# Patient Record
Sex: MALE | Race: BLACK OR AFRICAN AMERICAN | NOT HISPANIC OR LATINO | Employment: PART TIME | URBAN - METROPOLITAN AREA
[De-identification: names, ages, dates, MRNs, and addresses within clinical notes are randomized per-mention and may not be internally consistent; named-entity substitution may affect disease eponyms.]

---

## 2020-01-12 ENCOUNTER — HOSPITAL ENCOUNTER (EMERGENCY)
Facility: HOSPITAL | Age: 30
Discharge: HOME | End: 2020-01-12
Attending: EMERGENCY MEDICINE | Admitting: EMERGENCY MEDICINE
Payer: COMMERCIAL

## 2020-01-12 ENCOUNTER — APPOINTMENT (EMERGENCY)
Dept: RADIOLOGY | Facility: HOSPITAL | Age: 30
End: 2020-01-12
Payer: COMMERCIAL

## 2020-01-12 VITALS
TEMPERATURE: 98 F | HEIGHT: 71 IN | DIASTOLIC BLOOD PRESSURE: 83 MMHG | BODY MASS INDEX: 22.4 KG/M2 | HEART RATE: 70 BPM | RESPIRATION RATE: 20 BRPM | OXYGEN SATURATION: 99 % | WEIGHT: 160 LBS | SYSTOLIC BLOOD PRESSURE: 130 MMHG

## 2020-01-12 DIAGNOSIS — J45.21 EXACERBATION OF INTERMITTENT ASTHMA, UNSPECIFIED ASTHMA SEVERITY: ICD-10-CM

## 2020-01-12 DIAGNOSIS — J40 BRONCHITIS: Primary | ICD-10-CM

## 2020-01-12 LAB
ALBUMIN SERPL BCP-MCNC: 3.4 G/DL (ref 3.5–5)
ALP SERPL-CCNC: 61 U/L (ref 46–116)
ALT SERPL W P-5'-P-CCNC: 34 U/L (ref 12–78)
ANION GAP SERPL CALCULATED.3IONS-SCNC: 10 MMOL/L (ref 4–13)
AST SERPL W P-5'-P-CCNC: 42 U/L (ref 5–45)
BASOPHILS # BLD AUTO: 0.02 THOUSANDS/ΜL (ref 0–0.1)
BASOPHILS NFR BLD AUTO: 0 % (ref 0–1)
BILIRUB SERPL-MCNC: 0.6 MG/DL (ref 0.2–1)
BILIRUB UR QL STRIP: NEGATIVE
BUN SERPL-MCNC: 10 MG/DL (ref 5–25)
CALCIUM SERPL-MCNC: 8.6 MG/DL (ref 8.3–10.1)
CHLORIDE SERPL-SCNC: 102 MMOL/L (ref 100–108)
CLARITY UR: CLEAR
CO2 SERPL-SCNC: 28 MMOL/L (ref 21–32)
COLOR UR: YELLOW
CREAT SERPL-MCNC: 0.96 MG/DL (ref 0.6–1.3)
EOSINOPHIL # BLD AUTO: 0 THOUSAND/ΜL (ref 0–0.61)
EOSINOPHIL NFR BLD AUTO: 0 % (ref 0–6)
ERYTHROCYTE [DISTWIDTH] IN BLOOD BY AUTOMATED COUNT: 11.7 % (ref 11.6–15.1)
FLUAV RNA NPH QL NAA+PROBE: NORMAL
FLUBV RNA NPH QL NAA+PROBE: NORMAL
GFR SERPL CREATININE-BSD FRML MDRD: 123 ML/MIN/1.73SQ M
GLUCOSE SERPL-MCNC: 90 MG/DL (ref 65–140)
GLUCOSE UR STRIP-MCNC: NEGATIVE MG/DL
HCT VFR BLD AUTO: 40.9 % (ref 36.5–49.3)
HGB BLD-MCNC: 14.2 G/DL (ref 12–17)
HGB UR QL STRIP.AUTO: NEGATIVE
IMM GRANULOCYTES # BLD AUTO: 0.03 THOUSAND/UL (ref 0–0.2)
IMM GRANULOCYTES NFR BLD AUTO: 1 % (ref 0–2)
KETONES UR STRIP-MCNC: NEGATIVE MG/DL
LACTATE SERPL-SCNC: 2.7 MMOL/L (ref 0.5–2)
LEUKOCYTE ESTERASE UR QL STRIP: NEGATIVE
LYMPHOCYTES # BLD AUTO: 3.37 THOUSANDS/ΜL (ref 0.6–4.47)
LYMPHOCYTES NFR BLD AUTO: 55 % (ref 14–44)
MCH RBC QN AUTO: 32.4 PG (ref 26.8–34.3)
MCHC RBC AUTO-ENTMCNC: 34.7 G/DL (ref 31.4–37.4)
MCV RBC AUTO: 93 FL (ref 82–98)
MONOCYTES # BLD AUTO: 0.36 THOUSAND/ΜL (ref 0.17–1.22)
MONOCYTES NFR BLD AUTO: 6 % (ref 4–12)
NEUTROPHILS # BLD AUTO: 2.31 THOUSANDS/ΜL (ref 1.85–7.62)
NEUTS SEG NFR BLD AUTO: 38 % (ref 43–75)
NITRITE UR QL STRIP: NEGATIVE
NRBC BLD AUTO-RTO: 0 /100 WBCS
PH UR STRIP.AUTO: 6 [PH]
PLATELET # BLD AUTO: 197 THOUSANDS/UL (ref 149–390)
PMV BLD AUTO: 10.7 FL (ref 8.9–12.7)
POTASSIUM SERPL-SCNC: 3.2 MMOL/L (ref 3.5–5.3)
PROT SERPL-MCNC: 8.3 G/DL (ref 6.4–8.2)
PROT UR STRIP-MCNC: NEGATIVE MG/DL
RBC # BLD AUTO: 4.38 MILLION/UL (ref 3.88–5.62)
RSV RNA NPH QL NAA+PROBE: NORMAL
S PYO DNA THROAT QL NAA+PROBE: NORMAL
SODIUM SERPL-SCNC: 140 MMOL/L (ref 136–145)
SP GR UR STRIP.AUTO: 1.01 (ref 1–1.03)
UROBILINOGEN UR QL STRIP.AUTO: 0.2 E.U./DL
WBC # BLD AUTO: 6.09 THOUSAND/UL (ref 4.31–10.16)

## 2020-01-12 PROCEDURE — 84145 PROCALCITONIN (PCT): CPT | Performed by: EMERGENCY MEDICINE

## 2020-01-12 PROCEDURE — 81003 URINALYSIS AUTO W/O SCOPE: CPT | Performed by: EMERGENCY MEDICINE

## 2020-01-12 PROCEDURE — 87631 RESP VIRUS 3-5 TARGETS: CPT | Performed by: EMERGENCY MEDICINE

## 2020-01-12 PROCEDURE — 96374 THER/PROPH/DIAG INJ IV PUSH: CPT

## 2020-01-12 PROCEDURE — 87651 STREP A DNA AMP PROBE: CPT | Performed by: EMERGENCY MEDICINE

## 2020-01-12 PROCEDURE — 80053 COMPREHEN METABOLIC PANEL: CPT | Performed by: EMERGENCY MEDICINE

## 2020-01-12 PROCEDURE — 96361 HYDRATE IV INFUSION ADD-ON: CPT

## 2020-01-12 PROCEDURE — 93005 ELECTROCARDIOGRAM TRACING: CPT

## 2020-01-12 PROCEDURE — 36415 COLL VENOUS BLD VENIPUNCTURE: CPT | Performed by: EMERGENCY MEDICINE

## 2020-01-12 PROCEDURE — 71046 X-RAY EXAM CHEST 2 VIEWS: CPT

## 2020-01-12 PROCEDURE — 83605 ASSAY OF LACTIC ACID: CPT | Performed by: EMERGENCY MEDICINE

## 2020-01-12 PROCEDURE — 94640 AIRWAY INHALATION TREATMENT: CPT

## 2020-01-12 PROCEDURE — 87536 HIV-1 QUANT&REVRSE TRNSCRPJ: CPT | Performed by: EMERGENCY MEDICINE

## 2020-01-12 PROCEDURE — 87040 BLOOD CULTURE FOR BACTERIA: CPT | Performed by: EMERGENCY MEDICINE

## 2020-01-12 PROCEDURE — 99285 EMERGENCY DEPT VISIT HI MDM: CPT | Performed by: EMERGENCY MEDICINE

## 2020-01-12 PROCEDURE — 99285 EMERGENCY DEPT VISIT HI MDM: CPT

## 2020-01-12 PROCEDURE — 85025 COMPLETE CBC W/AUTO DIFF WBC: CPT | Performed by: EMERGENCY MEDICINE

## 2020-01-12 RX ORDER — ONDANSETRON HYDROCHLORIDE 8 MG/1
8 TABLET, FILM COATED ORAL EVERY 8 HOURS PRN
COMMUNITY
Start: 2016-11-30 | End: 2020-01-12

## 2020-01-12 RX ORDER — ONDANSETRON 4 MG/1
4 TABLET, FILM COATED ORAL EVERY 8 HOURS PRN
Qty: 15 TABLET | Refills: 0 | Status: SHIPPED | OUTPATIENT
Start: 2020-01-12

## 2020-01-12 RX ORDER — CEFTRIAXONE SODIUM 250 MG/1
250 INJECTION, POWDER, FOR SOLUTION INTRAMUSCULAR; INTRAVENOUS DAILY
COMMUNITY
Start: 2017-01-17

## 2020-01-12 RX ORDER — BENZONATATE 100 MG/1
100 CAPSULE ORAL 3 TIMES DAILY PRN
Qty: 21 CAPSULE | Refills: 0 | Status: SHIPPED | OUTPATIENT
Start: 2020-01-12

## 2020-01-12 RX ORDER — BENZONATATE 100 MG/1
100 CAPSULE ORAL ONCE
Status: COMPLETED | OUTPATIENT
Start: 2020-01-12 | End: 2020-01-12

## 2020-01-12 RX ORDER — IPRATROPIUM BROMIDE AND ALBUTEROL SULFATE 2.5; .5 MG/3ML; MG/3ML
3 SOLUTION RESPIRATORY (INHALATION) ONCE
Status: COMPLETED | OUTPATIENT
Start: 2020-01-12 | End: 2020-01-12

## 2020-01-12 RX ORDER — METOCLOPRAMIDE HYDROCHLORIDE 5 MG/ML
10 INJECTION INTRAMUSCULAR; INTRAVENOUS ONCE
Status: COMPLETED | OUTPATIENT
Start: 2020-01-12 | End: 2020-01-12

## 2020-01-12 RX ORDER — DIPHENOXYLATE HYDROCHLORIDE AND ATROPINE SULFATE 2.5; .025 MG/1; MG/1
1 TABLET ORAL 4 TIMES DAILY PRN
COMMUNITY
Start: 2016-11-30

## 2020-01-12 RX ORDER — BUDESONIDE AND FORMOTEROL FUMARATE DIHYDRATE 160; 4.5 UG/1; UG/1
2 AEROSOL RESPIRATORY (INHALATION)
COMMUNITY
Start: 2016-05-06

## 2020-01-12 RX ADMIN — BENZONATATE 100 MG: 100 CAPSULE ORAL at 16:01

## 2020-01-12 RX ADMIN — METOCLOPRAMIDE 10 MG: 5 INJECTION, SOLUTION INTRAMUSCULAR; INTRAVENOUS at 13:40

## 2020-01-12 RX ADMIN — IPRATROPIUM BROMIDE AND ALBUTEROL SULFATE 3 ML: 2.5; .5 SOLUTION RESPIRATORY (INHALATION) at 13:12

## 2020-01-12 RX ADMIN — SODIUM CHLORIDE 1000 ML: 0.9 INJECTION, SOLUTION INTRAVENOUS at 13:30

## 2020-01-12 NOTE — DISCHARGE INSTRUCTIONS
Rest and drink lot of fluids  Continue present medications  Start Tessalon Perles for cough and Zofran for vomiting as needed  Contact your infectious disease specialist tomorrow morning  Have her review today's chart and results

## 2020-01-12 NOTE — SEPSIS NOTE
Sepsis Note   Portia Govea III 34 y o  male MRN: [id number]  Unit/Bed#: ED 04 Encounter: [id number]      qSOFA     Row Name 01/12/20 1600 01/12/20 1515 01/12/20 1218          Altered mental status GCS < 15            Respiratory Rate > / =22  0  0  0      Systolic BP < / =234  0  0  0      Q Sofa Score  0  0  0          Initial Sepsis Screening     Row Name 01/12/20 1522                Is the patient's history suggestive of a new or worsening infection? (!) Yes (Proceed)  -MF        Suspected source of infection   Probably viral bronchitis  -MF        Are two or more of the following signs & symptoms of infection both present and new to the patient? No  -MF        Indicate SIRS criteria          If the answer is yes to both questions, suspicion of sepsis is present          If severe sepsis is present AND tissue hypoperfusion perists in the hour after fluid resuscitation or lactate > 4, the patient meets criteria for SEPTIC SHOCK          Are any of the following organ dysfunction criteria present within 6 hours of suspected infection and SIRS criteria that are NOT considered to be chronic conditions?         Organ dysfunction          Date of presentation of severe sepsis          Time of presentation of severe sepsis          Tissue hypoperfusion persists in the hour after crystalloid fluid administration, evidenced, by either:          Was hypotension present within one hour of the conclusion of crystalloid fluid administration?           Date of presentation of septic shock          Time of presentation of septic shock            User Key  (r) = Recorded By, (t) = Taken By, (c) = Cosigned By    Initials Name Provider Type    AUGUSTINA Finney DO Physician

## 2020-01-12 NOTE — ED PROVIDER NOTES
History  Chief Complaint   Patient presents with    Shortness of Breath     Patient reports SOB and cough x1 weeks  states that he is unable to cough up mucus due to thickness of mucus  admits to fever, n/v/d  This 70-year-old man with history of HIV and asthma states that he has had fever, rigors,sore throat, nausea, diarrhea, cough, wheeze, dyspnea and weakness for the past 5 days  He admits having been noncompliant with his HIV medications but started back on Genvoya 1 or 2 weeks ago  He states his inhalers are not helping with his breathing  He feels that the chest is getting tighter and congested but cough only produces small amounts of clear sputum  He denies headache, nasal congestion, palpitations, bloody stool, dysuria and skin rash or wound  Prior to Admission Medications   Prescriptions Last Dose Informant Patient Reported? Taking?   budesonide-formoterol (SYMBICORT) 160-4 5 mcg/act inhaler 1/12/2020 at Unknown time  Yes Yes   Sig: Inhale 2 puffs   cefTRIAXone (ROCEPHIN) 250 mg injection Not Taking at Unknown time  Yes No   Sig: Inject 250 mg into a muscle daily   diphenoxylate-atropine (LOMOTIL) 2 5-0 025 mg per tablet Not Taking at Unknown time  Yes No   Sig: Take 1 tablet by mouth 4 (four) times a day as needed   elvitegravir-cobicistat-emtricitabine-tenofovir alafenamide (GENVOYA) tablet 1/12/2020 at Unknown time  Yes Yes   Sig: TAKE 1 TABLET BY MOUTH EVERY DAY   ipratropium-albuterol (COMBIVENT RESPIMAT) inhaler 1/12/2020 at Unknown time  Yes Yes   Sig: Inhale 1 puff 4 (four) times a day   ondansetron (ZOFRAN) 8 mg tablet More than a month at Unknown time  Yes No   Sig: Take 8 mg by mouth every 8 (eight) hours as needed      Facility-Administered Medications: None       Past Medical History:   Diagnosis Date    Asthma     [facility]        History reviewed  No pertinent surgical history  History reviewed  No pertinent family history    I have reviewed and agree with the history as documented  Social History     Tobacco Use    Smoking status: Current Every Day Smoker     Types: Cigars    Smokeless tobacco: Never Used   Substance Use Topics    Alcohol use: Yes     Alcohol/week: 28 0 standard drinks     Types: 28 Cans of beer per week    Drug use: Not Currently        Review of Systems   Constitutional: Positive for appetite change, chills, fatigue and fever  HENT: Positive for rhinorrhea and sore throat  Negative for congestion, ear pain, postnasal drip and sinus pressure  Eyes: Negative  Respiratory: Positive for cough, shortness of breath and wheezing  Cardiovascular: Negative  Gastrointestinal: Positive for diarrhea and nausea  Negative for abdominal pain and vomiting  Endocrine: Negative  Genitourinary: Negative  Musculoskeletal: Positive for back pain and myalgias  Negative for neck stiffness  Skin: Negative  Allergic/Immunologic: Negative  Neurological: Negative  Hematological: Negative  Psychiatric/Behavioral: Negative  All other systems reviewed and are negative  Physical Exam  Physical Exam   Constitutional: He is oriented to person, place, and time  He appears well-developed and well-nourished  Non-toxic appearance  He appears ill  He appears distressed  HENT:   Head: Normocephalic and atraumatic  Right Ear: External ear normal    Left Ear: External ear normal    Mouth/Throat: Oropharyngeal exudate present  No posterior oropharyngeal edema  Eyes: Pupils are equal, round, and reactive to light  Conjunctivae and EOM are normal    Neck: Normal range of motion  Neck supple  No JVD present  Cardiovascular: Normal rate, regular rhythm, normal heart sounds and intact distal pulses  No murmur heard  Pulmonary/Chest: Effort normal and breath sounds normal  He exhibits no bony tenderness  Frequent coughing   Abdominal: Soft  Bowel sounds are normal  He exhibits no mass   There is no tenderness  There is no rebound and no guarding  Musculoskeletal: Normal range of motion  He exhibits no edema or tenderness  Right lower leg: Normal         Left lower leg: Normal    Lymphadenopathy:     He has no cervical adenopathy  Neurological: He is alert and oriented to person, place, and time  He has normal reflexes  No cranial nerve deficit  Coordination normal    Skin: Skin is warm and dry  Capillary refill takes less than 2 seconds  No rash noted  Psychiatric: He has a normal mood and affect  His behavior is normal  His mood appears not anxious  He is not agitated  Nursing note and vitals reviewed        Vital Signs  ED Triage Vitals   Temperature Pulse Respirations Blood Pressure SpO2   01/12/20 1218 01/12/20 1218 01/12/20 1218 01/12/20 1218 01/12/20 1218   98 °F (36 7 °C) 86 20 162/92 98 %      Temp Source Heart Rate Source Patient Position - Orthostatic VS BP Location FiO2 (%)   01/12/20 1218 01/12/20 1515 01/12/20 1218 01/12/20 1218 --   Tympanic Monitor Sitting Right arm       Pain Score       01/12/20 1218       8           Vitals:    01/12/20 1218 01/12/20 1515 01/12/20 1600   BP: 162/92 129/87 130/83   Pulse: 86 74 70   Patient Position - Orthostatic VS: Sitting Lying Lying         Visual Acuity      ED Medications  Medications   ipratropium-albuterol (DUO-NEB) 0 5-2 5 mg/3 mL inhalation solution 3 mL (3 mL Nebulization Given 1/12/20 1312)   sodium chloride 0 9 % bolus 1,000 mL (1,000 mL Intravenous New Bag 1/12/20 1330)   metoclopramide (REGLAN) injection 10 mg (10 mg Intravenous Given 1/12/20 1340)   benzonatate (TESSALON PERLES) capsule 100 mg (100 mg Oral Given 1/12/20 1601)       Diagnostic Studies  Results Reviewed     Procedure Component Value Units Date/Time    Lactic acid, plasma [[id number]]  (Abnormal) Collected:  01/12/20 1331    Lab Status:  Final result Specimen:  Blood from Arm, Right Updated:  01/12/20 1521     LACTIC ACID 2 7 mmol/L     Narrative:       Result may be elevated if tourniquet was used during collection      Strep A PCR [[id number]]  (Normal) Collected:  01/12/20 1336    Lab Status:  Final result Specimen:  Throat Updated:  01/12/20 1504     STREP A PCR None Detected    Influenza A/B and RSV PCR [[id number]]  (Normal) Collected:  01/12/20 1336    Lab Status:  Final result Specimen:  Nares from Nose Updated:  01/12/20 1503     INFLUENZA A PCR None Detected     INFLUENZA B PCR None Detected     RSV PCR None Detected    Comprehensive metabolic panel [[id number]]  (Abnormal) Collected:  01/12/20 1331    Lab Status:  Final result Specimen:  Blood from Arm, Right Updated:  01/12/20 1437     Sodium 140 mmol/L      Potassium 3 2 mmol/L      Chloride 102 mmol/L      CO2 28 mmol/L      ANION GAP 10 mmol/L      BUN 10 mg/dL      Creatinine 0 96 mg/dL      Glucose 90 mg/dL      Calcium 8 6 mg/dL      AST 42 U/L      ALT 34 U/L      Alkaline Phosphatase 61 U/L      Total Protein 8 3 g/dL      Albumin 3 4 g/dL      Total Bilirubin 0 60 mg/dL      eGFR 123 ml/min/1 73sq m     Narrative:       [facility] guidelines for Chronic Kidney Disease (CKD):     Stage 1 with normal or high GFR (GFR > 90 mL/min/1 73 square meters)    Stage 2 Mild CKD (GFR = 60-89 mL/min/1 73 square meters)    Stage 3A Moderate CKD (GFR = 45-59 mL/min/1 73 square meters)    Stage 3B Moderate CKD (GFR = 30-44 mL/min/1 73 square meters)    Stage 4 Severe CKD (GFR = 15-29 mL/min/1 73 square meters)    Stage 5 End Stage CKD (GFR <15 mL/min/1 73 square meters)  Note: GFR calculation is accurate only with a steady state creatinine    UA (URINE) with reflex to Scope [[id number]] Collected:  01/12/20 1336    Lab Status:  Final result Specimen:  Urine, Clean Catch Updated:  01/12/20 1404     Color, UA Yellow     Clarity, UA Clear     Specific Gravity, UA 1 010     pH, UA 6 0     Leukocytes, UA Negative     Nitrite, UA Negative     Protein, UA Negative mg/dl      Glucose, UA Negative mg/dl Ketones, UA Negative mg/dl      Urobilinogen, UA 0 2 E U /dl      Bilirubin, UA Negative     Blood, UA Negative    CBC and differential [[id number]]  (Abnormal) Collected:  01/12/20 1331    Lab Status:  Final result Specimen:  Blood from Arm, Right Updated:  01/12/20 1402     WBC 6 09 Thousand/uL      RBC 4 38 Million/uL      Hemoglobin 14 2 g/dL      Hematocrit 40 9 %      MCV 93 fL      MCH 32 4 pg      MCHC 34 7 g/dL      RDW 11 7 %      MPV 10 7 fL      Platelets 241 Thousands/uL      nRBC 0 /100 WBCs      Neutrophils Relative 38 %      Immat GRANS % 1 %      Lymphocytes Relative 55 %      Monocytes Relative 6 %      Eosinophils Relative 0 %      Basophils Relative 0 %      Neutrophils Absolute 2 31 Thousands/µL      Immature Grans Absolute 0 03 Thousand/uL      Lymphocytes Absolute 3 37 Thousands/µL      Monocytes Absolute 0 36 Thousand/µL      Eosinophils Absolute 0 00 Thousand/µL      Basophils Absolute 0 02 Thousands/µL     Procalcitonin [[id number]] Collected:  01/12/20 1331    Lab Status: In process Specimen:  Blood from Arm, Right Updated:  01/12/20 1358    Blood culture #1 [[id number]] Collected:  01/12/20 1330    Lab Status: In process Specimen:  Blood from Arm, Right Updated:  01/12/20 1357    HIV-1 RNA, quantitative, PCR [[id number]] Collected:  01/12/20 1330    Lab Status: In process Specimen:  Blood from Arm, Right Updated:  01/12/20 1355    T-helper cells CD4/CD8 % [[id number]] Collected:  01/12/20 1330    Lab Status: In process Specimen:  Blood from Arm, Right Updated:  01/12/20 1355    Blood culture #2 [[id number]] Collected:  01/12/20 1331    Lab Status:   In process Specimen:  Blood from Hand, Right Updated:  01/12/20 1354                 XR chest 2 views   ED Interpretation by Trevon José DO (01/12 1535)   No infiltrate pneumothorax or effusion                 Procedures  ECG 12 Lead Documentation Only  Date/Time: 1/12/2020 4:21 PM  Performed by: Trevon José DO  Authorized by: Elinor Ferris CINDY Stafford,      ECG reviewed by me, the ED Provider: yes    Patient location:  ED  Previous ECG:     Previous ECG:  Unavailable  Rhythm:     Rhythm: sinus rhythm    Ectopy:     Ectopy: none    QRS:     QRS axis:  Normal    QRS intervals:  Normal  Conduction:     Conduction: normal    ST segments:     ST segments:  Normal  T waves:     T waves: normal    Q waves:     Q waves:  V2             ED Course  ED Course as of Jan 12 1625   Sun Jan 12, 2020   1535 Patient feels much better  No coughing currently  He does ask for an antitussive though  Vital signs are stable  Initial Sepsis Screening     Row Name 01/12/20 1522                Is the patient's history suggestive of a new or worsening infection? (!) Yes (Proceed)  -MF        Suspected source of infection   Probably viral bronchitis  -MF        Are two or more of the following signs & symptoms of infection both present and new to the patient? No  -MF        Indicate SIRS criteria          If the answer is yes to both questions, suspicion of sepsis is present          If severe sepsis is present AND tissue hypoperfusion perists in the hour after fluid resuscitation or lactate > 4, the patient meets criteria for SEPTIC SHOCK          Are any of the following organ dysfunction criteria present within 6 hours of suspected infection and SIRS criteria that are NOT considered to be chronic conditions?         Organ dysfunction          Date of presentation of severe sepsis          Time of presentation of severe sepsis          Tissue hypoperfusion persists in the hour after crystalloid fluid administration, evidenced, by either:          Was hypotension present within one hour of the conclusion of crystalloid fluid administration?           Date of presentation of septic shock          Time of presentation of septic shock            User Key  (r) = Recorded By, (t) = Taken By, (c) = Cosigned By    Initials Name Provider Type     Charly Tolentino DO Physician                  MDM  Number of Diagnoses or Management Options  Bronchitis: new and requires workup  Exacerbation of intermittent asthma, unspecified asthma severity: new and requires workup     Amount and/or Complexity of Data Reviewed  Clinical lab tests: ordered and reviewed  Tests in the radiology section of CPT®: ordered and reviewed  Independent visualization of images, tracings, or specimens: yes          Disposition  Final diagnoses:   Bronchitis   Exacerbation of intermittent asthma, unspecified asthma severity     Time reflects when diagnosis was documented in both MDM as applicable and the Disposition within this note     Time User Action Codes Description Comment    1/12/2020  4:14 PM Elvis Radon Add [J40] Bronchitis     1/12/2020  4:14 PM Elvis Radon Add [J45 21] Exacerbation of intermittent asthma, unspecified asthma severity       ED Disposition     ED Disposition Condition Date/Time Comment    Discharge Stable Sun Jan 12, 2020  4:14 PM Kirit Verma III discharge to home/self care  Follow-up Information     Follow up With Specialties Details Why [facility] Miriam Izquierdo MD Infectious Diseases Call in 1 day For follow-up [address] [facility]  [phone]            Patient's Medications   Discharge Prescriptions    BENZONATATE (TESSALON PERLES) 100 MG CAPSULE    Take 1 capsule (100 mg total) by mouth 3 (three) times a day as needed for cough       Start Date: 1/12/2020 End Date: --       Order Dose: 100 mg       Quantity: 21 capsule    Refills: 0    ONDANSETRON (ZOFRAN) 4 MG TABLET    Take 1 tablet (4 mg total) by mouth every 8 (eight) hours as needed for nausea or vomiting       Start Date: 1/12/2020 End Date: --       Order Dose: 4 mg       Quantity: 15 tablet    Refills: 0     No discharge procedures on file      ED Provider  Electronically Signed by           Charly Tolentino DO  01/12/20 Yobani Stafford DO  01/12/20 1838

## 2020-01-13 LAB — PROCALCITONIN SERPL-MCNC: <0.05 NG/ML

## 2020-01-14 LAB
ATRIAL RATE: 74 BPM
ATRIAL RATE: 78 BPM
P AXIS: 66 DEGREES
P AXIS: 72 DEGREES
PR INTERVAL: 140 MS
PR INTERVAL: 140 MS
QRS AXIS: 76 DEGREES
QRS AXIS: 78 DEGREES
QRSD INTERVAL: 92 MS
QRSD INTERVAL: 94 MS
QT INTERVAL: 374 MS
QT INTERVAL: 374 MS
QTC INTERVAL: 415 MS
QTC INTERVAL: 426 MS
T WAVE AXIS: 59 DEGREES
T WAVE AXIS: 60 DEGREES
VENTRICULAR RATE: 74 BPM
VENTRICULAR RATE: 78 BPM

## 2020-01-14 PROCEDURE — 93010 ELECTROCARDIOGRAM REPORT: CPT | Performed by: INTERNAL MEDICINE

## 2020-01-16 LAB
HIV1 RNA # SERPL NAA+PROBE: 1850 COPIES/ML
HIV1 RNA SERPL NAA+PROBE-LOG#: 3.27 LOG10COPY/ML

## 2020-01-17 LAB
BACTERIA BLD CULT: NORMAL
BACTERIA BLD CULT: NORMAL

## 2023-07-05 ENCOUNTER — APPOINTMENT (OUTPATIENT)
Dept: RADIOLOGY | Facility: HOSPITAL | Age: 33
End: 2023-07-05
Payer: COMMERCIAL

## 2023-07-05 ENCOUNTER — APPOINTMENT (EMERGENCY)
Dept: CT IMAGING | Facility: HOSPITAL | Age: 33
End: 2023-07-05
Payer: COMMERCIAL

## 2023-07-05 ENCOUNTER — HOSPITAL ENCOUNTER (EMERGENCY)
Facility: HOSPITAL | Age: 33
Discharge: HOME | End: 2023-07-06
Attending: EMERGENCY MEDICINE
Payer: COMMERCIAL

## 2023-07-05 ENCOUNTER — APPOINTMENT (EMERGENCY)
Dept: RADIOLOGY | Facility: HOSPITAL | Age: 33
End: 2023-07-05
Payer: COMMERCIAL

## 2023-07-05 VITALS
DIASTOLIC BLOOD PRESSURE: 76 MMHG | HEIGHT: 71 IN | BODY MASS INDEX: 25.9 KG/M2 | RESPIRATION RATE: 20 BRPM | OXYGEN SATURATION: 97 % | TEMPERATURE: 98.8 F | SYSTOLIC BLOOD PRESSURE: 121 MMHG | HEART RATE: 87 BPM | WEIGHT: 185 LBS

## 2023-07-05 DIAGNOSIS — M25.532 ACUTE PAIN OF LEFT WRIST: Primary | ICD-10-CM

## 2023-07-05 DIAGNOSIS — R07.9 CHEST PAIN: ICD-10-CM

## 2023-07-05 LAB
ANION GAP SERPL CALCULATED.3IONS-SCNC: 13 MMOL/L
BASOPHILS # BLD AUTO: 0.03 THOUSANDS/ÂΜL (ref 0–0.1)
BASOPHILS NFR BLD AUTO: 1 % (ref 0–1)
BNP SERPL-MCNC: 7 PG/ML (ref 0–100)
BUN SERPL-MCNC: 7 MG/DL (ref 5–25)
CALCIUM SERPL-MCNC: 9.4 MG/DL (ref 8.4–10.2)
CARDIAC TROPONIN I PNL SERPL HS: 4 NG/L
CHLORIDE SERPL-SCNC: 102 MMOL/L (ref 96–108)
CO2 SERPL-SCNC: 24 MMOL/L (ref 21–32)
CREAT SERPL-MCNC: 0.82 MG/DL (ref 0.6–1.3)
D DIMER PPP FEU-MCNC: 0.59 UG/ML FEU
EOSINOPHIL # BLD AUTO: 0.11 THOUSAND/ÂΜL (ref 0–0.61)
EOSINOPHIL NFR BLD AUTO: 2 % (ref 0–6)
ERYTHROCYTE [DISTWIDTH] IN BLOOD BY AUTOMATED COUNT: 11.2 % (ref 11.6–15.1)
GFR SERPL CREATININE-BSD FRML MDRD: 116 ML/MIN/1.73SQ M
GLUCOSE SERPL-MCNC: 107 MG/DL (ref 65–140)
HCT VFR BLD AUTO: 44.8 % (ref 36.5–49.3)
HGB BLD-MCNC: 15.3 G/DL (ref 12–17)
IMM GRANULOCYTES # BLD AUTO: 0.02 THOUSAND/UL (ref 0–0.2)
IMM GRANULOCYTES NFR BLD AUTO: 0 % (ref 0–2)
LYMPHOCYTES # BLD AUTO: 2.16 THOUSANDS/ÂΜL (ref 0.6–4.47)
LYMPHOCYTES NFR BLD AUTO: 42 % (ref 14–44)
MCH RBC QN AUTO: 32.5 PG (ref 26.8–34.3)
MCHC RBC AUTO-ENTMCNC: 34.2 G/DL (ref 31.4–37.4)
MCV RBC AUTO: 95 FL (ref 82–98)
MONOCYTES # BLD AUTO: 0.39 THOUSAND/ÂΜL (ref 0.17–1.22)
MONOCYTES NFR BLD AUTO: 8 % (ref 4–12)
NEUTROPHILS # BLD AUTO: 2.49 THOUSANDS/ÂΜL (ref 1.85–7.62)
NEUTS SEG NFR BLD AUTO: 47 % (ref 43–75)
NRBC BLD AUTO-RTO: 0 /100 WBCS
PLATELET # BLD AUTO: 209 THOUSANDS/UL (ref 149–390)
PMV BLD AUTO: 9.8 FL (ref 8.9–12.7)
POTASSIUM SERPL-SCNC: 3.6 MMOL/L (ref 3.5–5.3)
RBC # BLD AUTO: 4.71 MILLION/UL (ref 3.88–5.62)
SODIUM SERPL-SCNC: 139 MMOL/L (ref 135–147)
WBC # BLD AUTO: 5.2 THOUSAND/UL (ref 4.31–10.16)

## 2023-07-05 PROCEDURE — 36415 COLL VENOUS BLD VENIPUNCTURE: CPT

## 2023-07-05 PROCEDURE — G1004 CDSM NDSC: HCPCS

## 2023-07-05 PROCEDURE — 85025 COMPLETE CBC W/AUTO DIFF WBC: CPT | Performed by: EMERGENCY MEDICINE

## 2023-07-05 PROCEDURE — 71275 CT ANGIOGRAPHY CHEST: CPT

## 2023-07-05 PROCEDURE — 84484 ASSAY OF TROPONIN QUANT: CPT | Performed by: EMERGENCY MEDICINE

## 2023-07-05 PROCEDURE — 80048 BASIC METABOLIC PNL TOTAL CA: CPT | Performed by: EMERGENCY MEDICINE

## 2023-07-05 PROCEDURE — 93005 ELECTROCARDIOGRAM TRACING: CPT

## 2023-07-05 PROCEDURE — 83880 ASSAY OF NATRIURETIC PEPTIDE: CPT | Performed by: EMERGENCY MEDICINE

## 2023-07-05 PROCEDURE — 85379 FIBRIN DEGRADATION QUANT: CPT | Performed by: EMERGENCY MEDICINE

## 2023-07-05 PROCEDURE — 73130 X-RAY EXAM OF HAND: CPT

## 2023-07-05 PROCEDURE — 71046 X-RAY EXAM CHEST 2 VIEWS: CPT

## 2023-07-05 RX ADMIN — IOHEXOL 85 ML: 350 INJECTION, SOLUTION INTRAVENOUS at 22:58

## 2023-07-05 NOTE — Clinical Note
Marissa [place] was seen and treated in our emergency department on 7/5/2023. No restrictions            Diagnosis:     Waldo Ashton  . He may return on this date: 07/10/2023         If you have any questions or concerns, please don't hesitate to call.       Dileep Quinn, DO    ______________________________           _______________          _______________  Hospital Representative                              Date                                Time

## 2023-07-06 LAB
2HR DELTA HS TROPONIN: -1 NG/L
ATRIAL RATE: 83 BPM
ATRIAL RATE: 85 BPM
ATRIAL RATE: 94 BPM
CARDIAC TROPONIN I PNL SERPL HS: 3 NG/L
P AXIS: 50 DEGREES
P AXIS: 55 DEGREES
P AXIS: 55 DEGREES
PR INTERVAL: 142 MS
PR INTERVAL: 146 MS
PR INTERVAL: 148 MS
QRS AXIS: 52 DEGREES
QRS AXIS: 53 DEGREES
QRS AXIS: 84 DEGREES
QRSD INTERVAL: 84 MS
QRSD INTERVAL: 84 MS
QRSD INTERVAL: 88 MS
QT INTERVAL: 352 MS
QT INTERVAL: 356 MS
QT INTERVAL: 360 MS
QTC INTERVAL: 418 MS
QTC INTERVAL: 423 MS
QTC INTERVAL: 445 MS
T WAVE AXIS: 1 DEGREES
T WAVE AXIS: 34 DEGREES
T WAVE AXIS: 35 DEGREES
VENTRICULAR RATE: 83 BPM
VENTRICULAR RATE: 85 BPM
VENTRICULAR RATE: 94 BPM

## 2023-07-06 PROCEDURE — 93010 ELECTROCARDIOGRAM REPORT: CPT | Performed by: INTERNAL MEDICINE

## 2023-07-06 PROCEDURE — 93005 ELECTROCARDIOGRAM TRACING: CPT

## 2023-07-06 NOTE — DISCHARGE INSTRUCTIONS
Please follow-up with orthopedic surgery for further care, if symptoms worsen please return to the emergency department

## 2023-07-06 NOTE — ED PROVIDER NOTES
History  Chief Complaint   Patient presents with   • Arm Swelling     Pt reports left hand swelling with redness,leg swelling. Patient on medicine from cardiologist,carvedilol. Pt eliquis for arrhythmia for a month. • Chest Pain     35year old male with multiple complaints , hx of Asthma, HIV (undetectable viral load but possibly low CD4 count, no available records) currently on Lavada Core which he has been compliant with presents for evaluation of chest pain, severe pressure this morning which has since resolved, nonexertional, nonpositional, no associated shortness of breath nausea vomiting. States that it is the worst he is ever felt and does have significant family history of cardiac arrest at an early age in mom so he was worried. He states that he is currently on carvedilol for some sort of arrhythmia and extraventricular beats also on Eliquis for possible atrial fibrillation after wearing an outpatient Holter monitor for 1 week. These medicines are prescribed by his cardiologist at [facility]. Also states that he has been having left wrist swelling which improved after ibuprofen, pain with movement and paresthesias which has been going on since yesterday. Also with some ankle swelling that has since resolved, no leg swelling . No asymmetrical ankle swelling or ankle pain . no arm swelling, no current chest pain or shortness of breath. History of DVT or PE. Has been compliant with his Eliquis          Prior to Admission Medications   Prescriptions Last Dose Informant Patient Reported?  Taking?   benzonatate (TESSALON PERLES) 100 mg capsule   No No   Sig: Take 1 capsule (100 mg total) by mouth 3 (three) times a day as needed for cough   budesonide-formoterol (SYMBICORT) 160-4.5 mcg/act inhaler   Yes No   Sig: Inhale 2 puffs   cefTRIAXone (ROCEPHIN) 250 mg injection   Yes No   Sig: Inject 250 mg into a muscle daily   diphenoxylate-atropine (LOMOTIL) 2.5-0.025 mg per tablet   Yes No   Sig: Take 1 tablet by mouth 4 (four) times a day as needed   elvitegravir-cobicistat-emtricitabine-tenofovir alafenamide (GENVOYA) tablet   Yes No   Sig: TAKE 1 TABLET BY MOUTH EVERY DAY   ipratropium-albuterol (COMBIVENT RESPIMAT) inhaler   Yes No   Sig: Inhale 1 puff 4 (four) times a day   ondansetron (ZOFRAN) 4 mg tablet   No No   Sig: Take 1 tablet (4 mg total) by mouth every 8 (eight) hours as needed for nausea or vomiting      Facility-Administered Medications: None       Past Medical History:   Diagnosis Date   • Asthma    • HIV (human immunodeficiency virus infection) ([address])    • Hypertension        History reviewed. No pertinent surgical history. History reviewed. No pertinent family history. I have reviewed and agree with the history as documented. E-Cigarette/Vaping     E-Cigarette/Vaping Substances     Social History     Tobacco Use   • Smoking status: Every Day     Types: Cigars   • Smokeless tobacco: Never   Substance Use Topics   • Alcohol use: Yes     Alcohol/week: 28.0 standard drinks of alcohol     Types: 28 Cans of beer per week   • Drug use: Not Currently       Review of Systems   Constitutional: Negative for appetite change, chills and fever. HENT: Negative for rhinorrhea and sore throat. Eyes: Negative for photophobia and visual disturbance. Respiratory: Negative for cough and shortness of breath. Cardiovascular: Positive for chest pain. Negative for palpitations. Gastrointestinal: Negative for abdominal pain and diarrhea. Genitourinary: Negative for dysuria, frequency and urgency. Musculoskeletal:        Left wrist pain   Skin: Negative for rash. Neurological: Negative for dizziness and weakness. All other systems reviewed and are negative. Physical Exam  Physical Exam  Vitals and nursing note reviewed. Constitutional:       Appearance: He is well-developed. HENT:      Head: Normocephalic and atraumatic.       Right Ear: External ear normal.      Left Ear: External ear normal. Eyes:      Conjunctiva/sclera: Conjunctivae normal.      Pupils: Pupils are equal, round, and reactive to light. Neck:      Vascular: No JVD. Trachea: No tracheal deviation. Cardiovascular:      Rate and Rhythm: Normal rate and regular rhythm. Heart sounds: Normal heart sounds. No murmur heard. No friction rub. No gallop. Pulmonary:      Effort: Pulmonary effort is normal. No respiratory distress. Breath sounds: No stridor. No wheezing or rales. Abdominal:      General: There is no distension. Palpations: Abdomen is soft. There is no mass. Tenderness: There is no abdominal tenderness. There is no guarding or rebound. Musculoskeletal:         General: Normal range of motion. Cervical back: Normal range of motion and neck supple. Right lower leg: No tenderness. Left lower leg: No tenderness. Comments: No obvious swelling to bilateral arms, palpable radial pulses bilateral wrists, neurovascular intact with mild decreased  strength in left hand  Bilateral lower extremity symmetrical without any calf tenderness, no lower extremity swelling, no ankle swelling   Skin:     General: Skin is warm and dry. Coloration: Skin is not pale. Findings: No erythema or rash. Neurological:      Mental Status: He is alert and oriented to person, place, and time. Cranial Nerves: No cranial nerve deficit.          Vital Signs  ED Triage Vitals   Temperature Pulse Respirations Blood Pressure SpO2   07/05/23 2135 07/05/23 2135 07/05/23 2135 07/05/23 2135 07/05/23 2135   98.8 °F (37.1 °C) 87 18 142/89 98 %      Temp Source Heart Rate Source Patient Position - Orthostatic VS BP Location FiO2 (%)   07/05/23 2135 07/05/23 2315 -- -- --   Oral Monitor         Pain Score       07/05/23 2135       No Pain           Vitals:    07/05/23 2135 07/05/23 2315 07/05/23 2330   BP: 142/89 135/95 121/76   Pulse: 87 86 87         Visual Acuity      ED Medications  Medications iohexol (OMNIPAQUE) 350 MG/ML injection (SINGLE-DOSE) 85 mL (85 mL Intravenous Given 7/5/23 2258)       Diagnostic Studies  Results Reviewed     Procedure Component Value Units Date/Time    HS Troponin I 2hr [[id number]]  (Normal) Collected: 07/05/23 2339    Lab Status: Final result Specimen: Blood from Arm, Right Updated: 07/06/23 0009     hs TnI 2hr 3 ng/L      Delta 2hr hsTnI -1 ng/L     HS Troponin I 4hr [[id number]]     Lab Status: No result Specimen: Blood     B-Type Natriuretic Peptide(BNP) [[id number]]  (Normal) Collected: 07/05/23 2147    Lab Status: Final result Specimen: Blood from Arm, Right Updated: 07/05/23 2219     BNP 7 pg/mL     D-dimer, quantitative [[id number]]  (Abnormal) Collected: 07/05/23 2147    Lab Status: Final result Specimen: Blood from Arm, Right Updated: 07/05/23 2218     D-Dimer, Quant 0.59 ug/ml FEU     HS Troponin 0hr (reflex protocol) [[id number]]  (Normal) Collected: 07/05/23 2147    Lab Status: Final result Specimen: Blood from Arm, Right Updated: 07/05/23 2216     hs TnI 0hr 4 ng/L     Basic metabolic panel [[id number]] Collected: 07/05/23 2147    Lab Status: Final result Specimen: Blood from Arm, Right Updated: 07/05/23 2208     Sodium 139 mmol/L      Potassium 3.6 mmol/L      Chloride 102 mmol/L      CO2 24 mmol/L      ANION GAP 13 mmol/L      BUN 7 mg/dL      Creatinine 0.82 mg/dL      Glucose 107 mg/dL      Calcium 9.4 mg/dL      eGFR 116 ml/min/1.73sq m     Narrative:      [place] guidelines for Chronic Kidney Disease (CKD):   •  Stage 1 with normal or high GFR (GFR > 90 mL/min/1.73 square meters)  •  Stage 2 Mild CKD (GFR = 60-89 mL/min/1.73 square meters)  •  Stage 3A Moderate CKD (GFR = 45-59 mL/min/1.73 square meters)  •  Stage 3B Moderate CKD (GFR = 30-44 mL/min/1.73 square meters)  •  Stage 4 Severe CKD (GFR = 15-29 mL/min/1.73 square meters)  •  Stage 5 End Stage CKD (GFR <15 mL/min/1.73 square meters)  Note: GFR calculation is accurate only with a steady state creatinine    CBC and differential [[id number]]  (Abnormal) Collected: 07/05/23 2147    Lab Status: Final result Specimen: Blood from Arm, Right Updated: 07/05/23 2154     WBC 5.20 Thousand/uL      RBC 4.71 Million/uL      Hemoglobin 15.3 g/dL      Hematocrit 44.8 %      MCV 95 fL      MCH 32.5 pg      MCHC 34.2 g/dL      RDW 11.2 %      MPV 9.8 fL      Platelets 870 Thousands/uL      nRBC 0 /100 WBCs      Neutrophils Relative 47 %      Immat GRANS % 0 %      Lymphocytes Relative 42 %      Monocytes Relative 8 %      Eosinophils Relative 2 %      Basophils Relative 1 %      Neutrophils Absolute 2.49 Thousands/µL      Immature Grans Absolute 0.02 Thousand/uL      Lymphocytes Absolute 2.16 Thousands/µL      Monocytes Absolute 0.39 Thousand/µL      Eosinophils Absolute 0.11 Thousand/µL      Basophils Absolute 0.03 Thousands/µL                  XR hand 3+ views LEFT   ED Interpretation by Derick Pelaez DO (07/05 2326)   This study was ordered and independently reviewed by me    No acute findings noted         CTA ED chest PE Study   Final Result by Yanique Bueno MD (07/06 0001)      No intraluminal filling defect to suggest an acute pulmonary embolus. No infiltrate or pleural effusion. Workstation performed: [id number]         XR chest pa & lateral   ED Interpretation by Derick Pelaez DO (07/05 2326)   This study was ordered and independently reviewed by me    No acute findings noted                    Procedures  Procedures         ED Course  ED Course as of 07/06/23 0050   Wed Jul 05, 2023   2307 Patient notes reviewed, patient including telemedicine note from 5/19/2022, patient with asthma, in setting of long-haul COVID symptoms, at that time treated for acute exacerbation.   Patient sees Dr. Polly Simpson with infectious disease and [place] for his HIV treatment per patient his current viral load  undetectable but he has been having issues with low CD4 count   [address] for HAART , cd4 in upper 200s, viral load undectable, labs done in may.,   Thu Jul 06, 2023   Jose Door Repeat EKG without any acute changes, did have another EKG taken with with abnormal lead placement that appeared to have inferior lateral changes, currently patient without any chest pain, on replacing the leads EKG looks ischemic with no acute changes patient currently asymptomatic, wrist pain improved with bracing, 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, pain likely in setting of carpal tunnel syndrome , will start on Voltaren gel, symptomatic control with bracing at night, ortho followup no indication for further inpatient treatment or evaluation of the patient's chest pain             HEART Risk Score    Flowsheet Row Most Recent Value   Heart Score Risk Calculator    History 0 Filed at: 07/06/2023 0043   ECG 1 Filed at: 07/06/2023 0043   Age 0 Filed at: 07/06/2023 0043   Risk Factors 1 Filed at: 07/06/2023 0043   Troponin 0 Filed at: 07/06/2023 0043   HEART Score 2 Filed at: 07/06/2023 1813                        SBIRT 22yo+    Flowsheet Row Most Recent Value   Initial Alcohol Screen: US AUDIT-C     1. How often do you have a drink containing alcohol? 1 Filed at: 07/05/2023 2222   2. How many drinks containing alcohol do you have on a typical day you are drinking? 0 Filed at: 07/05/2023 2222   3a. Male UNDER 65: How often do you have five or more drinks on one occasion? 0 Filed at: 07/05/2023 2222   3b. FEMALE Any Age, or MALE 65+: How often do you have 4 or more drinks on one occassion? 0 Filed at: 07/05/2023 2222   Audit-C Score 1 Filed at: 07/05/2023 2222   SORAYA: How many times in the past year have you. .. Used an illegal drug or used a prescription medication for non-medical reasons?  Never Filed at: 07/05/2023 2222                    Medical Decision Making  55-year-old male with with left wrist pain and transient swelling, likely musculoskeletal injury versus carpal tunnel syndrome, low suspicion for fracture, low suspicion for DVT as there is no arm swelling, no erythema, no arm pain. Also with transient chest pain, currently resolved, EKG to evaluate for arrhythmia, troponin negative low suspicion for ACS, myocarditis, pericarditis given description of the symptoms, given elevated D-dimer will obtain CTA to evaluate for PE, infection            Amount and/or Complexity of Data Reviewed  Labs: ordered. Radiology: ordered. Risk  Prescription drug management. Disposition  Final diagnoses:   Acute pain of left wrist   Chest pain     Time reflects when diagnosis was documented in both MDM as applicable and the Disposition within this note     Time User Action Codes Description Comment    7/5/2023 11:27 PM Parveen Ascencio Add [M25.532] Acute pain of left wrist     7/5/2023 11:27 PM Parveen Ascencio Add [R07.9] Chest pain       ED Disposition     ED Disposition   Discharge    Condition   Stable    Date/Time   Thu Jul 6, 2023 12:44 AM    Comment   Lindy Wetzel III discharge to home/self care.                Follow-up Information     Follow up With Specialties Details Why Contact Info Additional [address] [facility] Emergency Department Emergency Medicine  If symptoms worsen [address] [facility] [id number]  [address] [facility] Emergency Department, [address] [facility], [address] [facility]    [address] [facility] [facility] [place] [facility] Schedule an appointment as soon as possible for a visit   Ana Nassar [phone]  [facility] [place], [address] [facility], [address] [place], [place], [address]          Patient's Medications   Discharge Prescriptions    DICLOFENAC SODIUM (VOLTAREN) 1 %    Apply 2 g topically 4 (four) times a day       Start Date: 7/6/2023  End Date: --       Order Dose: 2 g Quantity: 100 g    Refills: 0       No discharge procedures on file.     PDMP Review     None          ED Provider  Electronically Signed by           Janice Millan, DO  07/06/23 6421 [facility], DO  07/06/23 8998